# Patient Record
Sex: MALE | Race: WHITE | Employment: FULL TIME | URBAN - METROPOLITAN AREA
[De-identification: names, ages, dates, MRNs, and addresses within clinical notes are randomized per-mention and may not be internally consistent; named-entity substitution may affect disease eponyms.]

---

## 2019-12-27 ENCOUNTER — NURSE TRIAGE (OUTPATIENT)
Dept: NURSING | Facility: CLINIC | Age: 19
End: 2019-12-27

## 2019-12-28 NOTE — TELEPHONE ENCOUNTER
Yenifer calls back to complete triage regarding enlarged lymph node.    Per protocol, advised to be seenw ithin 24 hours. Care advice reviewed. Patient verbalizes understanding. Advised to call back with further questions/concerns.     Dee Verma RN/[employer] Nurse Advisor        Reason for Disposition    [1] Single large node AND [2] size > 1 inch (2.5 cm) AND [3] no fever    Additional Information    Negative: Severe difficulty breathing (e.g., struggling for each breath, speaks in single words)    Negative: [1] Node is in the neck AND [2] causes difficulty breathing    Negative: Fever > 103 F (39.4 C)    Negative: [1] Node is in the neck AND [2] can't swallow fluids    Negative: [1] Lump or swelling in groin AND [2] pulsating (like heartbeat)    Negative: Patient sounds very sick or weak to the triager    Negative: [1] Single large node AND [2] size > 1 inch (2.5 cm) AND [3] fever    Negative: [1] Overlying skin is red AND [2] fever    Protocols used: LYMPH NODES [id number]

## 2019-12-28 NOTE — TELEPHONE ENCOUNTER
"Yenifer calls with concerns regarding \"rubbery, hard\" lymph nodes on his left side of the neck. FNA started triage, initially was put on hold. FNA called him back to complete triage and he states that he is at work and hung up.    Dee Verma RN/[place] Nurse Advisor      Additional Information    Negative: Severe difficulty breathing (e.g., struggling for each breath, speaks in single words)    Negative: [1] Node is in the neck AND [2] causes difficulty breathing    Negative: [1] Node is in the neck AND [2] can't swallow fluids    Negative: Fever > 103 F (39.4 C)    Protocols used: LYMPH NODES [id number]      "

## 2020-01-16 ENCOUNTER — OFFICE VISIT (OUTPATIENT)
Dept: URGENT CARE | Facility: URGENT CARE | Age: 20
End: 2020-01-16
Payer: COMMERCIAL

## 2020-01-16 VITALS
OXYGEN SATURATION: 99 % | SYSTOLIC BLOOD PRESSURE: 119 MMHG | WEIGHT: 300 LBS | TEMPERATURE: 96.8 F | DIASTOLIC BLOOD PRESSURE: 64 MMHG | HEART RATE: 65 BPM

## 2020-01-16 DIAGNOSIS — M79.12 STERNOCLEIDOMASTOID MUSCLE TENDERNESS: Primary | ICD-10-CM

## 2020-01-16 PROCEDURE — 99203 OFFICE O/P NEW LOW 30 MIN: CPT | Performed by: PHYSICIAN ASSISTANT

## 2020-01-16 ASSESSMENT — ENCOUNTER SYMPTOMS
FEVER: 0
SORE THROAT: 0
NECK STIFFNESS: 0

## 2020-01-16 NOTE — PROGRESS NOTES
SUBJECTIVE:   Yenifer Canas is a 19 year old male presenting with a chief complaint of   Chief Complaint   Patient presents with     Urgent Care     Edema     swollen glads but throat doesnt hurt. thought he saw a bump on the right side of neck sometimes he can see it and sometimes he cant.        He is a new patient of [facility].  Patient presents with complaints of right side neck bulge that he noticed 2 weeks ago.  Patient denies ST, weight loss, night sweats or any medical problems.  UTD on vaccinations.          Review of Systems   Constitutional: Negative for fever.   HENT: Negative for ear pain and sore throat.    Musculoskeletal: Negative for neck stiffness.       No past medical history on file.  No family history on file.  No current outpatient medications on file.     Social History     Tobacco Use     Smoking status: Not on file   Substance Use Topics     Alcohol use: Not on file       OBJECTIVE  /64   Pulse 65   Temp 96.8  F (36  C) (Oral)   Wt 136.1 kg (300 lb)   SpO2 99%     Physical Exam  Vitals signs and nursing note reviewed.   Constitutional:       Appearance: Normal appearance. He is obese.   HENT:      Head: Normocephalic and atraumatic.      Right Ear: Tympanic membrane, ear canal and external ear normal.      Left Ear: Tympanic membrane, ear canal and external ear normal.      Nose: Nose normal.      Mouth/Throat:      Mouth: Mucous membranes are dry.      Pharynx: Oropharynx is clear.   Eyes:      Extraocular Movements: Extraocular movements intact.      Conjunctiva/sclera: Conjunctivae normal.   Neck:      Musculoskeletal: Normal range of motion and neck supple. No neck rigidity or muscular tenderness.      Vascular: No carotid bruit.      Comments: Patient points to and palpates his sternocleidomastoid as the bulge.    Cardiovascular:      Rate and Rhythm: Normal rate and regular rhythm.      Pulses: Normal pulses.      Heart sounds: Normal heart sounds.   Pulmonary:      Effort:  Pulmonary effort is normal.      Breath sounds: Normal breath sounds.   Lymphadenopathy:      Cervical: No cervical adenopathy.   Skin:     General: Skin is warm and dry.      Capillary Refill: Capillary refill takes less than 2 seconds.   Neurological:      General: No focal deficit present.      Mental Status: He is alert.   Psychiatric:         Mood and Affect: Mood normal.         Labs:  No results found for this or any previous visit (from the past 24 hour(s)).    X-Ray was not done.    ASSESSMENT:      ICD-10-CM    1. Sternocleidomastoid muscle tenderness M79.12         Medical Decision Making:    Differential Diagnosis:  Normal anatomy, mono, Strep, hodgkins,     Serious Comorbid Conditions:  Adult:  None    PLAN:    Monitor.  Discussed reasons to f/u with PCP.      Followup:    If not improving or if condition worsens, follow up with your Primary Care Provider, If not improving or if conditions worsens over the next 12-24 hours, go to the Emergency Department    Patient Instructions     Patient Education     Lymphadenopathy  Lymphadenopathy is swelling of the lymph nodes. Lymph nodes are small, bean-shaped glands around the body.  What are lymph nodes?  Lymph nodes are part of your immune system. These glands are found in your neck, over your clavicle, armpits, groin, chest, and abdomen. They act as filters for lymph fluid as it flows through your body. Lymph fluid contains white blood cells (lymphocytes) that help the body fight infection and disease.   Why lymph nodes swell  Lymphadenopathy is very common. The glands often enlarge during a viral or bacterial infection. It can happen during a cold, the flu, or strep throat. The nodes may swell in just one area of the body, such as the neck (localized). Or nodes may swell all over the body (generalized). The neck (cervical) lymph nodes are the most common site of lymphadenopathy.  What causes lymphadenopathy?  Dead cells and fluid build up in the lymph nodes  as they help fight infection or disease. This causes them to swell in size. Enlarged lymph nodes are often near the source of infection. This can help to find the cause of an infection. For example, swollen lymph nodes around the jaw may be because of an infection in the teeth or mouth. But lymphadenopathy may also be generalized. This is common in some viral illnesses such as infectious mononucleosis, HIV or chickenpox (varicella).  Lymphadenopathy can also be caused by:    Infection of a lymph node or small group of nodes (lymphadenitis)    Cancer    Reactions to medicines such as antibiotics and certain blood pressure, gout, and seizure medicines    Other health conditions, such as lupus or sarcoidosis  Symptoms of lymphadenopathy  Lymphadenopathy can cause symptoms such as:    Lumps under the jaw, on the sides or back of the neck, in the armpits, in the groin, or in the chest or belly (abdomen)    Pain or tenderness in any of these areas    Redness or warmth in any of these areas  You may also have symptoms from an infection causing the swollen glands. These symptoms may include fever, sore throat, body aches, or cough.  Diagnosing lymphadenopathy  Your healthcare provider will ask about your health history and symptoms. He or she will give you a physical exam and check the areas where lymph nodes are enlarged. Your healthcare provider will check the size. texture, and location of the nodes, and ask how long they have been swollen and if they are painful. Diagnostic tests and referral to specialists may be recommended. They may include:    Blood tests. These are done to check for signs of infection and other problems.    Urine test. This is also done to check for infection and other problems.    Chest X-ray, ultrasound, CT scan, or MRI scans. These tests can show enlarged lymph nodes or other problems.    Lymph node biopsy. The cause of enlarged lymph nodes may be checked with a biopsy. Small samples of lymph  node tissue are taken and checked in a lab for signs of infection, cancer and other causes. You may be referred to other specialistsfor their opinion as well.  Treatment for lymphadenopathy  The treatment of enlarged lymph nodes depends on the cause. Enlarged lymph nodes are often harmless and go away without any treatment. Treatment is most often done on the cause of the enlarged nodes and may include:    Antibiotic or antiviral medicine to treat a bacterial or viral infection    Incision and drainage of a lymph node for lymphadenitis    Other medicines or procedures to treat the cause of the enlarged nodes  You may need a follow-up exam in 3 to 4 weeks to recheck enlarged nodes.  When to call your healthcare provider  Call your healthcare provider if your symptoms worsen, you develop new symptoms, you have a fever of 100.4 F (38 C) or higher, lymph nodes that are still swollen after 3 to 4 weeks, or as directed by your healthcare provider.  Date Last Reviewed: 5/1/2017 2000-2019 [employer]. [address] [place] [address], [place] [address]. All rights reserved. This information is not intended as a substitute for professional medical care. Always follow your healthcare professional's instructions.

## 2020-01-16 NOTE — PATIENT INSTRUCTIONS
Patient Education     Lymphadenopathy  Lymphadenopathy is swelling of the lymph nodes. Lymph nodes are small, bean-shaped glands around the body.  What are lymph nodes?  Lymph nodes are part of your immune system. These glands are found in your neck, over your clavicle, armpits, groin, chest, and abdomen. They act as filters for lymph fluid as it flows through your body. Lymph fluid contains white blood cells (lymphocytes) that help the body fight infection and disease.   Why lymph nodes swell  Lymphadenopathy is very common. The glands often enlarge during a viral or bacterial infection. It can happen during a cold, the flu, or strep throat. The nodes may swell in just one area of the body, such as the neck (localized). Or nodes may swell all over the body (generalized). The neck (cervical) lymph nodes are the most common site of lymphadenopathy.  What causes lymphadenopathy?  Dead cells and fluid build up in the lymph nodes as they help fight infection or disease. This causes them to swell in size. Enlarged lymph nodes are often near the source of infection. This can help to find the cause of an infection. For example, swollen lymph nodes around the jaw may be because of an infection in the teeth or mouth. But lymphadenopathy may also be generalized. This is common in some viral illnesses such as infectious mononucleosis, HIV or chickenpox (varicella).  Lymphadenopathy can also be caused by:    Infection of a lymph node or small group of nodes (lymphadenitis)    Cancer    Reactions to medicines such as antibiotics and certain blood pressure, gout, and seizure medicines    Other health conditions, such as lupus or sarcoidosis  Symptoms of lymphadenopathy  Lymphadenopathy can cause symptoms such as:    Lumps under the jaw, on the sides or back of the neck, in the armpits, in the groin, or in the chest or belly (abdomen)    Pain or tenderness in any of these areas    Redness or warmth in any of these areas  You may  also have symptoms from an infection causing the swollen glands. These symptoms may include fever, sore throat, body aches, or cough.  Diagnosing lymphadenopathy  Your healthcare provider will ask about your health history and symptoms. He or she will give you a physical exam and check the areas where lymph nodes are enlarged. Your healthcare provider will check the size. texture, and location of the nodes, and ask how long they have been swollen and if they are painful. Diagnostic tests and referral to specialists may be recommended. They may include:    Blood tests. These are done to check for signs of infection and other problems.    Urine test. This is also done to check for infection and other problems.    Chest X-ray, ultrasound, CT scan, or MRI scans. These tests can show enlarged lymph nodes or other problems.    Lymph node biopsy. The cause of enlarged lymph nodes may be checked with a biopsy. Small samples of lymph node tissue are taken and checked in a lab for signs of infection, cancer and other causes. You may be referred to other specialistsfor their opinion as well.  Treatment for lymphadenopathy  The treatment of enlarged lymph nodes depends on the cause. Enlarged lymph nodes are often harmless and go away without any treatment. Treatment is most often done on the cause of the enlarged nodes and may include:    Antibiotic or antiviral medicine to treat a bacterial or viral infection    Incision and drainage of a lymph node for lymphadenitis    Other medicines or procedures to treat the cause of the enlarged nodes  You may need a follow-up exam in 3 to 4 weeks to recheck enlarged nodes.  When to call your healthcare provider  Call your healthcare provider if your symptoms worsen, you develop new symptoms, you have a fever of 100.4 F (38 C) or higher, lymph nodes that are still swollen after 3 to 4 weeks, or as directed by your healthcare provider.  Date Last Reviewed: 5/1/2017 2000-2019 The  [employer]. [address] [place] [address], [place] [address]. All rights reserved. This information is not intended as a substitute for professional medical care. Always follow your healthcare professional's instructions.

## 2021-01-23 ENCOUNTER — NURSE TRIAGE (OUTPATIENT)
Dept: NURSING | Facility: CLINIC | Age: 21
End: 2021-01-23

## 2021-01-23 NOTE — TELEPHONE ENCOUNTER
"Thursday seen in [facility]    Tonsilitis, given Cefdinir.     Has been on antibiotics not quite 48 hours. Throat still sore, said felt cold and chilled this am, eating drinking okay. Denies one side swelling or any difficulty swallowing or changes in breathing. Encouraged him to call where seen. Follow up on any culture. Said rapid was negative. Recommended f/u within 24 hour if not improving. He verbalized understanding.     Additional Information    Negative: Severe difficulty breathing (e.g., struggling for each breath, speaks in single words, stridor)    Negative: Sounds like a life-threatening emergency to the triager    Negative: [1] Diagnosed strep throat AND [2] taking antibiotic AND [3] symptoms continue    Negative: Throat culture results, call about    Negative: Productive cough is main symptom    Negative: Non-productive cough is main symptom    Negative: Hoarseness is main symptom    Negative: Runny nose is main symptom    Negative: [1] Drooling or spitting out saliva (because can't swallow) AND [2] normal breathing    Negative: Unable to open mouth completely    Negative: [1] Difficulty breathing AND [2] not severe    Negative: Fever > 104 F (40 C)    Negative: [1] Refuses to drink anything AND [2] for > 12 hours    Negative: [1] Drinking very little AND [2] dehydration suspected (e.g., no urine > 12 hours, very dry mouth, very lightheaded)    Negative: Patient sounds very sick or weak to the triager    [1] Pus on tonsils (back of throat) AND [2]  fever AND [3] swollen neck lymph nodes (\"glands\")    Negative: SEVERE (e.g., excruciating) throat pain    Protocols used: SORE THROAT-A-AH      "

## 2021-01-24 ENCOUNTER — HOSPITAL ENCOUNTER (EMERGENCY)
Facility: CLINIC | Age: 21
Discharge: HOME | End: 2021-01-24
Attending: EMERGENCY MEDICINE | Admitting: EMERGENCY MEDICINE
Payer: COMMERCIAL

## 2021-01-24 VITALS
OXYGEN SATURATION: 99 % | WEIGHT: 210 LBS | TEMPERATURE: 99 F | HEART RATE: 88 BPM | SYSTOLIC BLOOD PRESSURE: 135 MMHG | RESPIRATION RATE: 16 BRPM | DIASTOLIC BLOOD PRESSURE: 75 MMHG | HEIGHT: 73 IN | BODY MASS INDEX: 27.83 KG/M2

## 2021-01-24 DIAGNOSIS — B27.90 INFECTIOUS MONONUCLEOSIS WITHOUT COMPLICATION, INFECTIOUS MONONUCLEOSIS DUE TO UNSPECIFIED ORGANISM: ICD-10-CM

## 2021-01-24 LAB
ALBUMIN SERPL-MCNC: 3.7 G/DL (ref 3.4–5)
ALP SERPL-CCNC: 147 U/L (ref 40–150)
ALT SERPL W P-5'-P-CCNC: 224 U/L (ref 0–70)
ANION GAP SERPL CALCULATED.3IONS-SCNC: 5 MMOL/L (ref 3–14)
AST SERPL W P-5'-P-CCNC: 109 U/L (ref 0–45)
BASOPHILS # BLD AUTO: 0.1 10E9/L (ref 0–0.2)
BASOPHILS NFR BLD AUTO: 1 %
BILIRUB SERPL-MCNC: 0.6 MG/DL (ref 0.2–1.3)
BUN SERPL-MCNC: 7 MG/DL (ref 7–30)
CALCIUM SERPL-MCNC: 9.4 MG/DL (ref 8.5–10.1)
CHLORIDE SERPL-SCNC: 103 MMOL/L (ref 94–109)
CO2 SERPL-SCNC: 28 MMOL/L (ref 20–32)
CREAT SERPL-MCNC: 0.98 MG/DL (ref 0.66–1.25)
DIFFERENTIAL METHOD BLD: NORMAL
EOSINOPHIL # BLD AUTO: 0 10E9/L (ref 0–0.7)
EOSINOPHIL NFR BLD AUTO: 0 %
ERYTHROCYTE [DISTWIDTH] IN BLOOD BY AUTOMATED COUNT: 12.5 % (ref 10–15)
GFR SERPL CREATININE-BSD FRML MDRD: >90 ML/MIN/{1.73_M2}
GLUCOSE SERPL-MCNC: 88 MG/DL (ref 70–99)
HCT VFR BLD AUTO: 45.4 % (ref 40–53)
HETEROPH AB SER QL: POSITIVE
HGB BLD-MCNC: 15 G/DL (ref 13.3–17.7)
LYMPHOCYTES # BLD AUTO: 4.3 10E9/L (ref 0.8–5.3)
LYMPHOCYTES NFR BLD AUTO: 49 %
MCH RBC QN AUTO: 30.5 PG (ref 26.5–33)
MCHC RBC AUTO-ENTMCNC: 33 G/DL (ref 31.5–36.5)
MCV RBC AUTO: 93 FL (ref 78–100)
MONOCYTES # BLD AUTO: 0.8 10E9/L (ref 0–1.3)
MONOCYTES NFR BLD AUTO: 9 %
NEUTROPHILS # BLD AUTO: 3.6 10E9/L (ref 1.6–8.3)
NEUTROPHILS NFR BLD AUTO: 41 %
PLATELET # BLD AUTO: 209 10E9/L (ref 150–450)
PLATELET # BLD EST: NORMAL 10*3/UL
POTASSIUM SERPL-SCNC: 3.7 MMOL/L (ref 3.4–5.3)
PROT SERPL-MCNC: 7.9 G/DL (ref 6.8–8.8)
RBC # BLD AUTO: 4.91 10E12/L (ref 4.4–5.9)
RBC MORPH BLD: NORMAL
SODIUM SERPL-SCNC: 136 MMOL/L (ref 133–144)
VARIANT LYMPHS BLD QL SMEAR: PRESENT
WBC # BLD AUTO: 8.8 10E9/L (ref 4–11)

## 2021-01-24 PROCEDURE — 258N000003 HC RX IP 258 OP 636: Performed by: EMERGENCY MEDICINE

## 2021-01-24 PROCEDURE — 80053 COMPREHEN METABOLIC PANEL: CPT | Performed by: EMERGENCY MEDICINE

## 2021-01-24 PROCEDURE — 96374 THER/PROPH/DIAG INJ IV PUSH: CPT

## 2021-01-24 PROCEDURE — 99284 EMERGENCY DEPT VISIT MOD MDM: CPT | Mod: 25

## 2021-01-24 PROCEDURE — 250N000011 HC RX IP 250 OP 636: Performed by: EMERGENCY MEDICINE

## 2021-01-24 PROCEDURE — 86308 HETEROPHILE ANTIBODY SCREEN: CPT | Performed by: EMERGENCY MEDICINE

## 2021-01-24 PROCEDURE — 96361 HYDRATE IV INFUSION ADD-ON: CPT

## 2021-01-24 PROCEDURE — 85025 COMPLETE CBC W/AUTO DIFF WBC: CPT | Performed by: EMERGENCY MEDICINE

## 2021-01-24 RX ORDER — DEXAMETHASONE SODIUM PHOSPHATE 10 MG/ML
10 INJECTION, SOLUTION INTRAMUSCULAR; INTRAVENOUS ONCE
Status: COMPLETED | OUTPATIENT
Start: 2021-01-24 | End: 2021-01-24

## 2021-01-24 RX ADMIN — SODIUM CHLORIDE 1000 ML: 9 INJECTION, SOLUTION INTRAVENOUS at 10:12

## 2021-01-24 RX ADMIN — DEXAMETHASONE SODIUM PHOSPHATE 10 MG: 10 INJECTION, SOLUTION INTRAMUSCULAR; INTRAVENOUS at 10:13

## 2021-01-24 ASSESSMENT — ENCOUNTER SYMPTOMS
CHILLS: 1
FEVER: 1
ABDOMINAL PAIN: 0
TROUBLE SWALLOWING: 1
SORE THROAT: 1

## 2021-01-24 ASSESSMENT — MIFFLIN-ST. JEOR: SCORE: 2006.43

## 2021-01-24 NOTE — ED PROVIDER NOTES
"   History   Chief Complaint  Pharyngitis    HPI   Yenifer Canas is a 20 year old male with a history of bipolar disorder and schizoaffective disorder who presents for evaluation of a sore throat and swollen glands for the past 6 days.  Patient was seen for this in urgent care and was empirically started on cefdinir however after 2 days he saw worsening symptoms and was switched to Azithromycin. He notes throat pain and swelling with some difficulty swallowing, however is able to tolerate PO and has been eating. He also endorses some low grade fevers and chills. Strep swab from his first urgent care visit was negative. This, as well as recent unprotected sexual activity in December, prompted him to seek STD testing 2 days ago at [facility] with results still pending. He denies any abdominal pain. Of note, the patient gained about 100 pounds on an antipsychotic medication and was taken off this in August of 2019 with loss of 100 pounds since then. He reports that he has been gurgling salt water at home.    Review of Systems   Constitutional: Positive for chills and fever.   HENT: Positive for sore throat and trouble swallowing.    Gastrointestinal: Negative for abdominal pain.   All other systems reviewed and are negative.      Allergies  NKDA    Medications  Azithromycin    Past Medical History  Bipolar disorder  Schizoaffective disorder  Anxiety    Social History  Tobacco use: former smoker  Alcohol use: yes  Drug use: no  Marital Status: single  PCP: Asa Galdamze    Physical Exam     Patient Vitals for the past 24 hrs:   BP Temp Temp src Pulse Resp SpO2 Height Weight   01/24/21 1127 135/75 -- -- -- -- -- -- --   01/24/21 0947 139/76 99  F (37.2  C) Oral 88 16 99 % 1.854 m (6' 1\") 95.3 kg (210 lb)       Physical Exam    General: Resting on the gurney,  Head:  The scalp, face, and head appear normal  Mouth/Throat: Posterior pharyngeal erythema and exudate, tonsillar " hypertrophy  CV:  Regular rate    Normal S1 and S2  No pathological murmur   Resp:  Breath sounds clear and equal bilaterally    Non-labored, no retractions or accessory muscle use    No coarseness    No wheezing   GI:  Abdomen is soft, no rigidity    No tenderness to palpation    spleen nonpalpable   MS:  Normal motor assessment of all extremities.    Good capillary refill noted.  Skin:  No rash or lesions noted.  Neuro: Speech is normal and fluent. No apparent deficit.  Psych:  Awake. Alert.  Normal affect.      Appropriate interactions.  Lymph: Occipital lymphadenopathy, cervical lymphadenopathy    Emergency Department Course   Laboratory:  Laboratory findings were communicated with the patient who voiced understanding of the findings.    CBC: WBC: 8.8, HGB: 15.0, PLT: 209  CMP: Glucose 88, ALT: 224 (high), AST: 109 (high), o/w WNL (Creatinine: 0.98)    Mononucleosis screen: Positive (A)    Emergency Department Course:  Reviewed:  0954 I reviewed the patient's nursing notes, vitals, past medical records, Care Everywhere.     Assessments:  0955 I physically examined the patient as documented above.  1150 I re-assessed the patient and updated them on the results of their workup thus far.    Interventions:  1012 NS 1L IV   1013 Decadron 10 mg IV    Disposition:  The patient was discharged to home.     Impression & Plan   Medical Decision Making:  Yenifer Canas is a 20 year old male who presented with chief complaint of throat soreness. On exam, there is erythema and exudates on the tonsils. The uvula is midline. I doubt peritonsillar abscess. The patient has a negative rapid strep test from urgent care and infectious mononucleosis screening is positive here. I reviewed this diagnosis with the patient. I recommended to the patient that they avoid any contact sports or any activity where the patient could sustain a splenic injury. I discussed their spleen is enlarged and if they have spontaneous abdominal pain very  rarely there could be spontaneous rupture of the spleen. I advised that the patient get plenty of sleep and stay well hydrated. Reasons to return to ED were reviewed, including worsening symptoms or any new concerns. The patient was in agreement with plan and discharged in satisfactory condition with all questions answered.       Diagnosis:    ICD-10-CM    1. Infectious mononucleosis without complication, infectious mononucleosis due to unspecified organism  B27.90 CBC with platelets differential       Disposition:  Discharged to home.    Scribe Disclosure:  I, Jan Rodriguez, am serving as a scribe at 9:54 AM on 1/24/2021 to document services personally performed by Sandra Styles MD based on my observations and the provider's statements to me.      Sandra Styles MD  01/24/21 1895

## 2021-01-24 NOTE — LETTER
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           Sincerely,        Dr. Sandra Styles